# Patient Record
Sex: FEMALE | Race: WHITE | URBAN - METROPOLITAN AREA
[De-identification: names, ages, dates, MRNs, and addresses within clinical notes are randomized per-mention and may not be internally consistent; named-entity substitution may affect disease eponyms.]

---

## 2017-06-07 ENCOUNTER — OFFICE VISIT (OUTPATIENT)
Dept: DERMATOLOGY | Facility: CLINIC | Age: 62
End: 2017-06-07

## 2017-06-07 DIAGNOSIS — Z12.83 SCREENING EXAM FOR SKIN CANCER: ICD-10-CM

## 2017-06-07 DIAGNOSIS — I78.1 TELANGIECTASIA: ICD-10-CM

## 2017-06-07 DIAGNOSIS — L82.1 SK (SEBORRHEIC KERATOSIS): ICD-10-CM

## 2017-06-07 DIAGNOSIS — L82.0 LICHENOID KERATOSIS: Primary | ICD-10-CM

## 2017-06-07 RX ORDER — LISINOPRIL 10 MG/1
10 TABLET ORAL DAILY
COMMUNITY

## 2017-06-07 RX ORDER — ELETRIPTAN HYDROBROMIDE 20 MG/1
20 TABLET, FILM COATED ORAL
COMMUNITY

## 2017-06-07 RX ORDER — FEXOFENADINE HCL 180 MG/1
TABLET ORAL DAILY
COMMUNITY

## 2017-06-07 RX ORDER — CITALOPRAM HYDROBROMIDE 10 MG/1
10 TABLET ORAL DAILY
COMMUNITY

## 2017-06-07 RX ORDER — FLUTICASONE PROPIONATE 50 MCG
1 SPRAY, SUSPENSION (ML) NASAL DAILY
COMMUNITY

## 2017-06-07 ASSESSMENT — PAIN SCALES - GENERAL: PAINLEVEL: NO PAIN (0)

## 2017-06-07 NOTE — PROGRESS NOTES
[facility] Dermatology Note      Dermatology Problem List:  1. Lichenoid keratosis    Encounter Date: Jun 7, 2017    CC:  Chief Complaint   Patient presents with     Derm Problem     Spot on chest of concern. Ritika was seen on Melanoma Monday.         History of Present Illness:  Ms. Ritika Long is a 61 year old female who presents as a referral from Melanoma Monday for lesion on her upper chest. The lesion has been there for months and has not significantly grown in size. It is not painful but it does itch. It bleeds occasionally, but only when itching. There were two other lesions Ritika had examined at [facility] Monday, one on her back and one on her arm, but was told both were not worrisome.     Ritika is not very concerned about the upper chest lesion today and she has no other skin concerns.     She has no history of melanoma or other skin cancers    Past Medical History:   -Hypertension  -Arthritis    Social History:  The patient works as a  at the [employer] now. 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    Family History:  There is no family history of melanoma.    Medications:  Current Outpatient Prescriptions   Medication Sig Dispense Refill     lisinopril (PRINIVIL/ZESTRIL) 10 MG tablet Take 10 mg by mouth daily       citalopram (CELEXA) 10 MG tablet Take 10 mg by mouth daily       eletriptan (RELPAX) 20 MG tablet Take 20 mg by mouth at onset of headache for migraine       fexofenadine (ALLEGRA ALLERGY) 180 MG tablet Take by mouth daily       fluticasone (FLONASE) 50 MCG/ACT spray Spray 1 spray into both nostrils daily       TURMERIC PO Take by mouth daily       Allergies   Allergen Reactions     Corn Pollen Itching         Review of Systems:  -Constitutional: Patient is otherwise feeling well.  -Skin: As above in HPI. No additional skin concerns.    Physical exam:  Vitals: There were no vitals taken for this visit.  GEN: This is  "a well developed, well-nourished female in no acute distress, in a pleasant mood.    SKIN: Focused examination of the upper chest, back and arms was performed and revealed:    - Skin type 2  - On the left upper chest pink with some fine overlying telangiectasias. Accumulation of white scale in the right lower corner. On dermoscopy non arborizing thin and wispy telangiectasias. Skin surface not shiny.   - Left mid back medium to dark brown papule that appeared to be \"stuck-on\"  - No other lesions of concern on areas examined.     Impression/Plan:  1. Lichenoid keratosis    Lesion on left upper chest did not exhibit features concerning for skin cancer (not shiny, very fine telangiectasias). Stable over the course of months, no history of bleeding. Offered patient the following treatment options: biopsy, cyrotherapy, or active non intervention. Patient opted for active non intervention.    Instructed patient to return to clinic if lesion enlarges or there is skin breakdown in that area with increased bleeding.     2. Seborrrheic keratoses  - reassured as to benign nature    Follow-up PRN for new or changing lesions.     Staff Involved:  Scribed by Chloe Dixon, MS4 for Dr. Tinajero     Staff attestation:  The documentation recorded by the scribe accurately reflects the services I personally performed and the decisions I personally made.    Nolberto Tinajero MD  Staff Dermatologist    Department of Dermatology    "

## 2017-06-07 NOTE — MR AVS SNAPSHOT
After Visit Summary   6/7/2017    Ritika Long    MRN: [id number]           Patient Information     Date Of Birth          1955        Visit Information        Provider Department      6/7/2017 7:45 AM Nolberto Tinajero MD [facility] Dermatology        Today's Diagnoses     Lichenoid keratosis    -  1    SK (seborrheic keratosis)        Screening exam for skin cancer        Telangiectasia           Follow-ups after your visit        Who to contact     Please call your clinic at [phone] to:    Ask questions about your health    Make or cancel appointments    Discuss your medicines    Learn about your test results    Speak to your doctor   If you have compliments or concerns about an experience at your clinic, or if you wish to file a complaint, please contact [facility] Patient Relations at [phone] or email us at [email or web address]         Additional Information About Your Visit        Care EveryWhere ID     This is your Care EveryWhere ID. This could be used by other organizations to access your [place] medical records  [id number]         Blood Pressure from Last 3 Encounters:   No data found for BP    Weight from Last 3 Encounters:   No data found for Wt              Today, you had the following     No orders found for display       Primary Care Provider Office Phone # Fax #    Bebe Esposito [phone]       [place] [address]        Thank you!     Thank you for choosing [facility] DERMATOLOGY  for your care. Our goal is always to provide you with excellent care. Hearing back from our patients is one way we can continue to improve our services. Please take a few minutes to complete the written survey that you may receive in the mail after your visit with us. Thank you!             Your Updated Medication List - Protect others around you: Learn how to safely use, store and  throw away your medicines at www.disposemymeds.org.          This list is accurate as of: 6/7/17 11:59 PM.  Always use your most recent med list.                   Brand Name Dispense Instructions for use    ALLEGRA ALLERGY 180 MG tablet   Generic drug:  fexofenadine      Take by mouth daily       citalopram 10 MG tablet    celeXA     Take 10 mg by mouth daily       FLONASE 50 MCG/ACT spray   Generic drug:  fluticasone      Spray 1 spray into both nostrils daily       lisinopril 10 MG tablet    PRINIVIL/ZESTRIL     Take 10 mg by mouth daily       RELPAX 20 MG tablet   Generic drug:  eletriptan      Take 20 mg by mouth at onset of headache for migraine       TURMERIC PO      Take by mouth daily

## 2017-06-07 NOTE — LETTER
6/7/2017       RE: Ritika Long  [address] [address]     Dear Colleague,    Thank you for referring your patient, Ritika Long, to the [facility] DERMATOLOGY at [facility]. Please see a copy of my visit note below.      [facility] Dermatology Note      Dermatology Problem List:  1. Lichenoid keratosis    Encounter Date: Jun 7, 2017    CC:  Chief Complaint   Patient presents with     Derm Problem     Spot on chest of concern. Ritika was seen on Melanoma Monday.         History of Present Illness:  Ms. Ritika Long is a 61 year old female who presents as a referral from Melanoma Monday for lesion on her upper chest. The lesion has been there for months and has not significantly grown in size. It is not painful but it does itch. It bleeds occasionally, but only when itching. There were two other lesions Ritika had examined at [facility] Monday, one on her back and one on her arm, but was told both were not worrisome.     Ritika is not very concerned about the upper chest lesion today and she has no other skin concerns.     She has no history of melanoma or other skin cancers    Past Medical History:   -Hypertension  -Arthritis    Social History:  The patient works as a  at the [employer] now. 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    Family History:  There is no family history of melanoma.    Medications:  Current Outpatient Prescriptions   Medication Sig Dispense Refill     lisinopril (PRINIVIL/ZESTRIL) 10 MG tablet Take 10 mg by mouth daily       citalopram (CELEXA) 10 MG tablet Take 10 mg by mouth daily       eletriptan (RELPAX) 20 MG tablet Take 20 mg by mouth at onset of headache for migraine       fexofenadine (ALLEGRA ALLERGY) 180 MG tablet Take by mouth daily       fluticasone (FLONASE) 50 MCG/ACT spray Spray 1 spray into both nostrils daily       TURMERIC PO Take by mouth daily  "      Allergies   Allergen Reactions     Corn Pollen Itching         Review of Systems:  -Constitutional: Patient is otherwise feeling well.  -Skin: As above in HPI. No additional skin concerns.    Physical exam:  Vitals: There were no vitals taken for this visit.  GEN: This is a well developed, well-nourished female in no acute distress, in a pleasant mood.    SKIN: Focused examination of the upper chest, back and arms was performed and revealed:    - Skin type 2  - On the left upper chest pink with some fine overlying telangiectasias. Accumulation of white scale in the right lower corner. On dermoscopy non arborizing thin and wispy telangiectasias. Skin surface not shiny.   - Left mid back medium to dark brown papule that appeared to be \"stuck-on\"  - No other lesions of concern on areas examined.     Impression/Plan:  1. Lichenoid keratosis    Lesion on left upper chest did not exhibit features concerning for skin cancer (not shiny, very fine telangiectasias). Stable over the course of months, no history of bleeding. Offered patient the following treatment options: biopsy, cyrotherapy, or active non intervention. Patient opted for active non intervention.    Instructed patient to return to clinic if lesion enlarges or there is skin breakdown in that area with increased bleeding.     2. Seborrrheic keratoses  - reassured as to benign nature    Follow-up PRN for new or changing lesions.     Staff Involved:  Scribed by Chloe Dixon, MS4 for Dr. Tinajero     Staff attestation:  The documentation recorded by the scribe accurately reflects the services I personally performed and the decisions I personally made.    Nolberto Tinajero MD  Staff Dermatologist    Department of Dermatology    "